# Patient Record
(demographics unavailable — no encounter records)

---

## 2024-11-20 NOTE — CONSULT LETTER
[Dear  ___] : Dear  [unfilled], [Courtesy Letter:] : I had the pleasure of seeing your patient, [unfilled], in my office today. [Please see my note below.] : Please see my note below. [Consult Closing:] : Thank you very much for allowing me to participate in the care of this patient.  If you have any questions, please do not hesitate to contact me. [Sincerely,] : Sincerely, [FreeTextEntry3] : Ward Ocasio MD

## 2024-11-20 NOTE — HISTORY OF PRESENT ILLNESS
[FreeTextEntry1] : DOMINIQUE GILBERT is a 68 year who returns to follow up for parkinsonism and low b12  last visit was 9/12/2024  since last visit had b12 injections and pills. taking 1000mcg PO daily and has had 10 injections of 1000mcg IM.   he feels carbidopa started end of Sept has been helpful in terms of feeling more limber and decreased tremor and improved handwriting, voice, and drooling.   he is constipation, taking senna and colace and started metamucil.  no increased orthostasis with sinemet  walking and doing floor exercises daily. did intake with PTyesterday

## 2025-04-18 NOTE — PHYSICAL EXAM
[FreeTextEntry1] : General: this is a pleasant patient in no acute distress  HEENT conjunctiva are normal, no tenderness in head  CV: normal pulses, regular rate and rhythm, no peripheral edema noted  Lungs: breathing is non-labored  abd: soft and non-distended  MSK: SLR: MISSY: range of motion: tinnels: spurling: Occipital nerve tenderness:  Mental status: Alert and oriented to person, place and time, normal speech and comprehension  Cranial Nerves: extra-occular movements in tact without nystagmus, normal saccades and smooth pursuit, Face symmetric and facial strength symmetric, facial sensation symmetric, masked face  Motor: normal bulk throughout. R > L arm and leg rigidity worse with contralateral activation. no abnormal movements. Full 5/5 strength uppers and lower extremities proximally and distally  Sensory: in tact and symmetric to vibration, light tough, temperature  Cerebellar: normal finger-nose-finger bilaterally  Reflexes: 3+ in the upper and lower extremities and symmetric. toes are bilaterally downgoing.  Gait: stopped posture more sitting than standing. stable, improved right arm swing, 4 step turn.  Stances: Romberg: normal.

## 2025-04-18 NOTE — HISTORY OF PRESENT ILLNESS
[FreeTextEntry1] : DOMINIQUE GILBERT is a 68 year who returns to follow up for parkinsonism and B12 deficiency.   last visit was 11/20/24. Parkinsonism, unspecified Parkinsonism type -responding to sinemet. still rigid on exam -increase sinemet to 1.5 tabs TID B12 deficiency -has achieved normal levels. unclear if low was due to low intake. -stop b12 supplement -re-check b12 in 3 months Constipation, chronic -part of PD, continue to treat  since last visit -Has been seen by [facility] Dr. Modi- No evidence of neurodegenerative dx  generally slow. constipation bad.  feels emotionally slow, but not anxious.   exercising 45 minutes in home and 45-90 min walking daily.  diet is very healthy.  sleep is mixed. sometimes animal protein keeps him awake if he eats it. woke himself kicking in sleep recently. had sleep study at [facility], mild SHYAM

## 2025-04-18 NOTE — DATA REVIEWED
[de-identified] : . In the context of his estimated above average premorbid functioning, current findings revealed intact cognitive functioning, as Mr. Castro performed well within or exceeded normal limits across most cognitive domains. His relatively lower scores on measures of psychomotor processing, initiation/fluency, and fine motor dexterity are consistent with his working diagnosis of Parkinson's disease. - There is no evidence of an underlying neurodegenerative disease affecting cognition at this time. - Subjective cognitive complaints likely reflect age-related cognitive changes and increased vigilance to any cognitive inefficiencies given his history of parkinsonism. . [de-identified] : recent b12 wnl  Labs 9/12/24: WNL: Free T4, TSH, Paraneoplastic Autoantibody Eval -B12 221 (Low)  MRI Brain 10/15/24: -Unremarkable